# Patient Record
Sex: FEMALE | Race: WHITE | HISPANIC OR LATINO | URBAN - METROPOLITAN AREA
[De-identification: names, ages, dates, MRNs, and addresses within clinical notes are randomized per-mention and may not be internally consistent; named-entity substitution may affect disease eponyms.]

---

## 2023-09-28 ENCOUNTER — OFFICE VISIT (OUTPATIENT)
Dept: DENTISTRY | Facility: CLINIC | Age: 63
End: 2023-09-28

## 2023-09-28 VITALS — SYSTOLIC BLOOD PRESSURE: 149 MMHG | TEMPERATURE: 98 F | HEART RATE: 73 BPM | DIASTOLIC BLOOD PRESSURE: 90 MMHG

## 2023-09-28 DIAGNOSIS — K02.9 DENTAL DECAY: Primary | ICD-10-CM

## 2023-09-28 PROBLEM — Z48.89 AFTERCARE FOLLOWING SURGERY: Status: ACTIVE | Noted: 2018-12-04

## 2023-09-28 PROBLEM — J45.20 MILD INTERMITTENT ASTHMA WITHOUT COMPLICATION: Status: ACTIVE | Noted: 2018-04-11

## 2023-09-28 PROBLEM — E66.9 OBESITY (BMI 30-39.9): Status: ACTIVE | Noted: 2018-04-11

## 2023-09-28 PROBLEM — M51.26 HERNIATION OF INTERVERTEBRAL DISC OF LUMBAR REGION: Status: ACTIVE | Noted: 2018-04-11

## 2023-09-28 PROBLEM — H54.7 VISUAL LOSS: Status: ACTIVE | Noted: 2018-04-11

## 2023-09-28 PROCEDURE — D0230 INTRAORAL - PERIAPICAL EACH ADDITIONAL RADIOGRAPHIC IMAGE: HCPCS

## 2023-09-28 PROCEDURE — D0140 LIMITED ORAL EVALUATION - PROBLEM FOCUSED: HCPCS | Performed by: DENTIST

## 2023-09-28 RX ORDER — ACETAMINOPHEN 500 MG
500 TABLET ORAL AS NEEDED
COMMUNITY

## 2023-09-28 RX ORDER — AMOXICILLIN 500 MG/1
500 CAPSULE ORAL EVERY 8 HOURS SCHEDULED
Qty: 21 CAPSULE | Refills: 0 | Status: SHIPPED | OUTPATIENT
Start: 2023-09-28 | End: 2023-10-05

## 2023-09-28 RX ORDER — IBUPROFEN 600 MG/1
600 TABLET ORAL EVERY 6 HOURS PRN
Qty: 30 TABLET | Refills: 0 | Status: SHIPPED | OUTPATIENT
Start: 2023-09-28

## 2023-09-28 NOTE — DENTAL PROCEDURE DETAILS
Subjective   Patient ID: Salo Yang is a 70 y.o. female.   Chief Complaint   Patient presents with    Emergency/limited Exam     Pain and swelling     Reviewed medical history   ASA 2    NP presented for limited exam/ emergency  Denies any meds except Tylenol PRN    Translation used ([id number])  her daughter, Duncan Diaz ,came into room for exam    CC # 19  pain 8/10 for about 2 weeks   tooth is broken and patient wants it extracted    EXAM  DR ALBARADO   Antibiotics prescribed  patient to return for extraction  Advised patient to schedule for comp exam so we can address other dental needs    May need to take pan, BW and anterior PAX   patient struggled with PAX- very strong tongue and cheek muscles    All questions answered

## 2023-10-13 ENCOUNTER — OFFICE VISIT (OUTPATIENT)
Dept: DENTISTRY | Facility: CLINIC | Age: 63
End: 2023-10-13

## 2023-10-13 VITALS — HEART RATE: 76 BPM | TEMPERATURE: 97.8 F | SYSTOLIC BLOOD PRESSURE: 150 MMHG | DIASTOLIC BLOOD PRESSURE: 79 MMHG

## 2023-10-13 DIAGNOSIS — K02.9 COMPLEX DENTAL CARIES: Primary | ICD-10-CM

## 2023-10-13 PROCEDURE — D7210 EXTRACTION, ERUPTED TOOTH REQUIRING REMOVAL OF BONE AND/OR SECTIONING OF TOOTH, AND INCLUDING ELEVATION OF MUCOPERIOSTEAL FLAP IF INDICATED: HCPCS | Performed by: DENTIST

## 2023-10-13 NOTE — DENTAL PROCEDURE DETAILS
Oral Surgery Surgical Extraction Tooth #19    George Cannon presents for Ext #19  [facility], patient denies any changes. Obtained a direct and personal consent. Risks and complications were explained. Pt agreed and consented. Consent scanned in [facility] center. DX: Tooth #19 non restorable deep extensive caries. HPI: Patient was seen last time by other provider for dental emergency of #19. Patient reported she finished the antibiotic medication prescribed to her last visit. Pre-Op BP: 150/70, Pulse 76. Administered two carps of 1.7 ML 2 % Lidocaine w/ 1:100,000 epi via SONU block and 4% Articaine with 1:100,000 epi for mandibular local infiltration injections. ?  Adequate anesthesia obtained, reflected gingiva, elevated a buccal flap, sectioned tooth and removed bone with surgical handpiece and extracted #19 using east/west surgical elevators and root forceps. Used bone file and socket irrigated, and 4.0 absorbable chromic gut sutures placed. Upon dismissal, patient received POI, ice, and gauze. Patient left satisfied and ambulatory. NV: COMP and FMX.

## 2024-12-19 ENCOUNTER — OFFICE VISIT (OUTPATIENT)
Dept: DENTISTRY | Facility: CLINIC | Age: 64
End: 2024-12-19

## 2024-12-19 DIAGNOSIS — K02.9 COMPLEX DENTAL CARIES: Primary | ICD-10-CM

## 2024-12-19 DIAGNOSIS — K05.6 GINGIVAL AND PERIODONTAL DISEASE: ICD-10-CM

## 2024-12-19 DIAGNOSIS — K08.3 RETAINED TOOTH ROOT: ICD-10-CM

## 2024-12-19 DIAGNOSIS — K08.109 TEETH MISSING: ICD-10-CM

## 2024-12-19 DIAGNOSIS — K06.9 GINGIVAL AND PERIODONTAL DISEASE: ICD-10-CM

## 2024-12-19 DIAGNOSIS — K02.9 CARIES: ICD-10-CM

## 2024-12-19 PROCEDURE — D0150 COMPREHENSIVE ORAL EVALUATION - NEW OR ESTABLISHED PATIENT: HCPCS | Performed by: DENTIST

## 2024-12-19 PROCEDURE — D0330 PANORAMIC RADIOGRAPHIC IMAGE: HCPCS | Performed by: DENTIST

## 2024-12-19 PROCEDURE — D0274 BITEWINGS - 4 RADIOGRAPHIC IMAGES: HCPCS | Performed by: DENTIST

## 2024-12-19 NOTE — PROGRESS NOTES
"Comprehensive Exam/PAN/4BW Radiographs     Gemma Ruiz 64 y.o. female presents with self to [facility] for comprehensive exam.  PMH reviewed, no changes, ASA II. Significant medical history: see list. Significant allergies: denies. Significant medications: see list.  Pain level: 0/10  Chief complaint: \"establish dental care\"   Dental History: patient was seen on 9/28/2023 for limited exam and on 10/13/2023 for extraction of tooth #19.   Consent:  Reviewed procedures involved with comprehensive exam including radiographs, oral exam, and periodontal probing.   Patient understands and consent was given by self via verbal consent.  Radiographs: PAN and 4 BWs. (Patient couldn't tolerate taking FMX due to strong tongue and cheek muscles).   Oral cancer screening: normal.  Extraoral exam: no remarkable findings.  Intraoral exam: Caries. Gingivitis. Missing teeth. .  Periodontal exam: see perio charting.   Hygiene - Good.  Plaque - Mild.  Horizontal bone loss -Localized.  Vertical bone loss - Tooth #32.  Subgingival calculus - Localized.  BOP - Localized.  Mobility - Tooth #32 degree II mobilty.  Furcation involvements - Tooth #32 class I-II.  Occlusal trauma - Traumatic cheek bite associated with super erupted tooth #32.  Periodontal Stage: Mild gingivitis.  Periodontal Grade: A.  Periodontal Plan: Prophy.  Caries exam:   Caries detected: teeth #3 Occ, #4 MOD, #7 ML, #8 MLFI, #11 D, #20 DO, #24 IFLMD, #25 IFLMD, #28 Occ, #29 DO/F cervical. Recommended restoration.   Likely nonrestorable teeth: tooth #32 MO caries. Degree II mobility. Super erupted with traumatic cheek bite. Recommended extraction.   Occlusal assessment:  VDO / restorative space - Collapsed.  AP Classification -  Right: Canine Class I; Molar Class I.  Left: Canine Class I; Molar Class I.  Partially edentulous upper and lower dental arches.   Other remarkable findings:  Asymptomatic possible retained root tip of tooth #2. Recommended evaluation and extraction " by [facility].   Tx plan:  1- Prophy.  2- Restorations teeth #3 Occ, #4 MOD, #7 ML, #8 MLFI, #11 D, #20 DO, #24 IFLMD, #25 IFLMD, #28 Occ, #29 DO/F cervical.  3- Referred to [facility] for extractions of teeth #2 retained root tip and #32.  4- Upper and lower metal base partial denture. (Praeuth is requested).   Referral(s): [facility] for 2 and #32 .  Recommended recall schedule: 6 months.  POI is given. Reviewed oral hygiene and need for recall visits.   Patient dismissed ambulatory and alert.  NV1: Prophy.  NV2: Restorative care.

## 2025-01-03 ENCOUNTER — TELEPHONE (OUTPATIENT)
Dept: DENTISTRY | Facility: CLINIC | Age: 65
End: 2025-01-03

## 2025-01-03 NOTE — TELEPHONE ENCOUNTER
called pt to verify ins info. Daughter answered and stated she will ask mom once she sees her what her ins is. Stated she will yomaira OSHEA

## 2025-05-16 ENCOUNTER — TELEPHONE (OUTPATIENT)
Dept: DENTISTRY | Facility: CLINIC | Age: 65
End: 2025-05-16